# Patient Record
(demographics unavailable — no encounter records)

---

## 2025-01-27 NOTE — PLAN
[FreeTextEntry1] : In regards to patients Physical exam, routine blood work drawn, will review results with patient.  28 week pregnancy- patient will follow with OB/GYN  Patient received TDAP vaccine today. Patient advised of adverse effects of medication including but not limited to muscle soreness at site of injection and general malaise   Counseling included abnormal lab results, differential diagnoses, treatment options, risks and benefits, lifestyle changes, current condition, medications, and dose adjustments.  The patient was interactive, attentive, asked questions, and verbalized understanding

## 2025-01-27 NOTE — HISTORY OF PRESENT ILLNESS
[FreeTextEntry1] : physical exam  [de-identified] : Ms. NATALIE DE LA PAZ is a 32 year old female comes to the office 28 weeks pregnant for Physical exam and TDAP vaccine. Patient denies fever, cough SOB. No other complaints at this time.

## 2025-01-27 NOTE — HEALTH RISK ASSESSMENT
[Good] : ~his/her~  mood as  good [No] : No [Never] : Never [NO] : No [Patient declined Low Dose CT Scan] : Patient declined Low Dose CT Scan [Patient declined Retinal Exam] : Patient declined Retinal Exam. [HIV test declined] : HIV test declined